# Patient Record
Sex: FEMALE | Race: WHITE | NOT HISPANIC OR LATINO | URBAN - METROPOLITAN AREA
[De-identification: names, ages, dates, MRNs, and addresses within clinical notes are randomized per-mention and may not be internally consistent; named-entity substitution may affect disease eponyms.]

---

## 2017-03-13 ENCOUNTER — OFFICE VISIT - RIVER FALLS (OUTPATIENT)
Dept: FAMILY MEDICINE | Facility: CLINIC | Age: 62
End: 2017-03-13

## 2017-03-13 ASSESSMENT — MIFFLIN-ST. JEOR: SCORE: 1162.86

## 2017-08-02 ENCOUNTER — OFFICE VISIT - RIVER FALLS (OUTPATIENT)
Dept: FAMILY MEDICINE | Facility: CLINIC | Age: 62
End: 2017-08-02

## 2017-08-02 ASSESSMENT — MIFFLIN-ST. JEOR: SCORE: 1176.47

## 2017-08-03 LAB
CREAT SERPL-MCNC: 0.97 MG/DL (ref 0.5–0.99)
GLUCOSE BLD-MCNC: 92 MG/DL (ref 65–99)

## 2017-09-15 ENCOUNTER — AMBULATORY - RIVER FALLS (OUTPATIENT)
Dept: FAMILY MEDICINE | Facility: CLINIC | Age: 62
End: 2017-09-15

## 2017-09-16 LAB
CHOLEST SERPL-MCNC: 187 MG/DL
CHOLEST/HDLC SERPL: 2.4 {RATIO}
HDLC SERPL-MCNC: 79 MG/DL
LDLC SERPL CALC-MCNC: 92 MG/DL
NONHDLC SERPL-MCNC: 108 MG/DL
TRIGL SERPL-MCNC: 71 MG/DL

## 2017-10-06 ENCOUNTER — AMBULATORY - RIVER FALLS (OUTPATIENT)
Dept: FAMILY MEDICINE | Facility: CLINIC | Age: 62
End: 2017-10-06

## 2018-09-21 ENCOUNTER — OFFICE VISIT - RIVER FALLS (OUTPATIENT)
Dept: FAMILY MEDICINE | Facility: CLINIC | Age: 63
End: 2018-09-21

## 2018-09-24 LAB
CHOLEST SERPL-MCNC: 209 MG/DL
CHOLEST/HDLC SERPL: 2.2 {RATIO}
HDLC SERPL-MCNC: 96 MG/DL
LDLC SERPL CALC-MCNC: 96 MG/DL
NONHDLC SERPL-MCNC: 113 MG/DL
TRIGL SERPL-MCNC: 82 MG/DL

## 2018-10-26 ENCOUNTER — OFFICE VISIT - RIVER FALLS (OUTPATIENT)
Dept: FAMILY MEDICINE | Facility: CLINIC | Age: 63
End: 2018-10-26

## 2018-10-26 ASSESSMENT — MIFFLIN-ST. JEOR: SCORE: 1173.75

## 2018-10-27 LAB
CREAT SERPL-MCNC: 0.98 MG/DL (ref 0.5–0.99)
GLUCOSE BLD-MCNC: 95 MG/DL (ref 65–99)

## 2018-10-31 ENCOUNTER — TRANSFERRED RECORDS (OUTPATIENT)
Dept: HEALTH INFORMATION MANAGEMENT | Facility: CLINIC | Age: 63
End: 2018-10-31

## 2018-10-31 LAB — HPV ABSTRACT: NORMAL

## 2022-02-11 VITALS
BODY MASS INDEX: 24.91 KG/M2 | DIASTOLIC BLOOD PRESSURE: 84 MMHG | TEMPERATURE: 98.1 F | HEART RATE: 72 BPM | TEMPERATURE: 98.6 F | SYSTOLIC BLOOD PRESSURE: 134 MMHG | HEIGHT: 64 IN | WEIGHT: 145 LBS | BODY MASS INDEX: 24.75 KG/M2 | SYSTOLIC BLOOD PRESSURE: 134 MMHG | WEIGHT: 144 LBS | HEART RATE: 84 BPM | DIASTOLIC BLOOD PRESSURE: 86 MMHG

## 2022-02-11 VITALS
HEART RATE: 69 BPM | SYSTOLIC BLOOD PRESSURE: 130 MMHG | OXYGEN SATURATION: 98 % | DIASTOLIC BLOOD PRESSURE: 80 MMHG | TEMPERATURE: 98 F | WEIGHT: 145.6 LBS | BODY MASS INDEX: 24.86 KG/M2 | HEIGHT: 64 IN

## 2022-02-11 VITALS
HEIGHT: 64 IN | TEMPERATURE: 98.3 F | BODY MASS INDEX: 24.34 KG/M2 | DIASTOLIC BLOOD PRESSURE: 72 MMHG | WEIGHT: 142.6 LBS | HEART RATE: 60 BPM | SYSTOLIC BLOOD PRESSURE: 120 MMHG

## 2022-02-16 NOTE — PROGRESS NOTES
"   Patient:   AURY ESPINOSA            MRN: [id number]            FIN: [id number]               Age:   62 years     Sex:  Female     :  1955   Associated Diagnoses:   GERD (gastroesophageal reflux disease); Parathyroid disease; Headache; Paresthesia   Author:   Tiffanie Moreno      Chief Complaint   3/13/2017 11:14 AM CDT   Pt here for lightheadedness, dizziness, chills/hot, feet, hands, arms, legs, face-feel tingly and hurt x 6 months getting worse.      History of Present Illness   Patient is a 61 yo female with past medical history of parathyroid adenoma s/p partial parathyroidectomy (Dr. Blakely: 2016 --> 2 of 4 glands removed), HTN, HLD who presents for tingling and pain in bilateral UEs and LEs. Per chart review, patient had MR brain on 16. Patient followed with [facility] on 16 for headaches. Diagnosed with poor sleep maintenance and chronic tension-type headaches. Note from visit states: \"I have reviewed her brain MRI personally with her and her . She has very few foci of scattered white matter changes, these are nonspecific and likely asymptomatic and noncontributory to her headaches. I''ll have her try amitriptyline to help decrease frequency and severity of headaches, and to also help her with sleep. At least tylenol is still effective as an abortive for her, she can continue to use it sparingly.\" Patient last followed with Dr. Blakely on 16, states she did not think any further surgery was indicated. Next follow up appt with Dr. Blakely is in May 2017.     Presents today with concern of tingling and \"crampy\" pain in extremities - reports LE tingling has been ongoing for at least 6 months, UE tingling has been ongoing for a few months (L > R). Patient was unsure if this was related to some component of tendonitis as she was frequently carrying her grandson and tried to use brace of UE without any relief. Tingling and pain seem to be constant but exacerbated when she is at " "rest. She does not notice symptoms as much when she is walking. Also complains of ongoing headaches, seem to be worse with lack of sleep and caffeine intake. States she feels lightheadded and \"off balance\" at times. No falls. Patient has been taking tylenol which mildly helps tingling/pain in extremities and headaches. She is not taking amitriptyline anymore because this did not help headaches. Patient also still experiencing reflux symptoms related to her hiatal hernia. Has relief of opeprazole but stopped taking this as she did not want to depend on medication. Tried using zantac but did not control symptoms.       Review of Systems   Constitutional:  No fever, No chills, No weakness, No fatigue, No decreased activity.    Eye:  No blurring, No double vision, No visual disturbances.    Ear/Nose/Mouth/Throat:  Negative.    Respiratory:  Negative.    Cardiovascular:  Negative.    Gastrointestinal:  Reflux, No nausea, No vomiting, No abdominal pain, No hematemesis.    Integumentary:  Negative.    Neurologic:  Alert and oriented X4, Abnormal balance, Numbness, Tingling, Headache, No confusion.       Health Status   Allergies:    Allergic Reactions (All)  No Known Medication Allergies  Canceled/Inactive Reactions (All)  No known allergies   Medications:  (Selected)   Prescriptions  Prescribed  amitriptyline 25 mg oral tablet: 1 tab(s) ( 25 mg ), po, hs, # 90 tab(s), 1 Refill(s), Type: Maintenance, Pharmacy: [facility] #[id number], 1 tab(s) po hs  lisinopril 10 mg oral tablet: 1 tab(s) ( 10 mg ), po, daily, # 90 tab(s), 3 Refill(s), Type: Maintenance, Pharmacy: [facility] #[id number], Please inform pt that she is overdue for annual check up and fasting labs per TOR.   Needs soon.   Thanks., 1 tab(s) po daily,x90 day(s)  simvastatin 20 mg oral tablet: 1 tab(s) ( 20 mg ), po, hs, # 90 tab(s), 3 Refill(s), Type: Maintenance, Pharmacy: [facility] #[id number], Pt is overdue for annual check up and fasting labs per TOR.   Please " inform pt.   Thanks., 1 tab(s) po hs,x90 day(s)  Documented Medications  Documented  Magnesium 150mg: Magnesium 150mg, See Instructions, Instructions: BID, Supply, 0 Refill(s), Type: Maintenance  Multiple Vitamins oral capsule: 1 cap(s), PO, Daily, 0 Refill(s), Type: Maintenance  Probiotic Formula: 1 cap(s), po, daily, 0 Refill(s), Type: Maintenance  Vitamin D3 5000 intl units oral tablet: 1 tab(s) ( 5,000 International Unit ), po, daily, 0 Refill(s), Type: Maintenance  aspirin: ( 81 mg ), po, daily, 0 Refill(s), Type: Maintenance  omeprazole 20 mg oral delayed release capsule: 1 cap(s) ( 20 mg ), PO, Daily, # 90 cap(s), 0 Refill(s), Type: Maintenance  raNITIdine 150 mg oral capsule: 1 cap(s) ( 150 mg ), po, prn, 0 Refill(s), Type: Maintenance   Problem list:    All Problems (Selected)  HTN (hypertension) / SNOMED CT 9786149804 / Confirmed  HLD (Hyperlipidemia) / SNOMED CT 071904205 / Confirmed  Parathyroid adenoma / SNOMED CT 212204938 / Confirmed  Seasonal allergies / SNOMED CT 934606585 / Confirmed  Former smoker, stopped smoking in distant past / SNOMED CT 767527131 / Confirmed      Histories   Past Medical History:    Active  HLD (Hyperlipidemia) (350446260)  HTN (hypertension) (5197339445)  Former smoker, stopped smoking in distant past (256023623)  Seasonal allergies (346000868)  Resolved  Inpatient stay (435088299): Onset on 2016 at 61 years.  Resolved on 2016 at 61 years.  Comments:  2016 CDT 4:54 PM CDT - Lexy Antonio  @[facility] - Primary hyperparathyroidism  Pregnancy (589836547):  Resolved in  at 21 years.  Pregnancy (785939419):  Resolved in  at 24 years.  Pregnancy (659163058):  Resolved in  at 29 years.   Family History:    Hypertension  Father ()  Sister  Sister  [place] - Cancer of colon  Father ()  Hypercholesterolemia  Father ()  Sister  Cancer of cervix  Sister     Procedure history:    Parathyroidectomy (26800097) on 2016 at 61  Years.  Comments:  2016 4:57 PM - Lexy Antonio  1. Bilateral superior parathyroidectomies.  2. PTH venous sampling.  3. Bilateral recurrent laryngeal nerve monitoring.  Upper GI endoscopy ([id number]) on 3/24/2016 at 61 Years.  Comments:  2016 1:29 PM - Amarilys Parker CMA  Impression:   1) LA Grade A reflux esophagitis. Biopsied.  2) Medium-sized hiatus hernia.  3) Normal  Biopsy Results:  A) Stomach, Antrum Biopsy     1. Gastric antral mucosa with some focal fundic features, showing with no diagnostic abnormalities.     2. Negative for Heliobacter organisms.  B) Esophagus, Biopsy     1. Esophageal squamous mucosa with no diagnostic abnormalities.     2. Some accompanying gastric mucosa     3. Negative for Rogers's metaplasia, reflux changes, and eosinophilic esophagitis.  Colonoscopy ([id number]) on 3/24/2016 at 61 Years.  Comments:  2016 1:40 PM - Amarilys Parker CMA  Indication: screening in patient at increased risk - family hx of 1st degree relative with colorectal ca  Sedation: MAC  Impression:   1) Diverticulosis in the sigmoid colon, in the proximal sigmoid colon and in the mid sigmoid colon.  2) No specimens collected.  3) Moderate diverticulosis in the sigmoid colon.  Echocardiogram ([id number]) on 3/16/2016 at 61 Years.  Comments:  3/21/2016 3:52 PM - Rosa Feliciano CMA  normal  Colonoscopy ([id number]) on 2011 at 56 Years.  Comments:  2011 8:55 AM - Guerda Mason MA  Repeat in 5 years  HPV - Human papillomavirus test negative (8500652968) on 3/24/2011 at 56 Years.  Comments:  3/31/2011 4:44 PM - Isabel Lawler  Low risk for cervical cancer. OK to have pap q 3 yrs.  Laparoscopy ([id number]) on 2000 at 45 Years.  Comments:  10/11/2010 11:52 AM - Sarah Ya  Remove right ovary and tubal ligation @ [facility].   x 3.  Comments:  10/11/2010 11:49 AM - Sarah Ya  , ,    Social History:        Alcohol Assessment: Current            Current           "           Comments:                      10/11/2016 - Wendy Garcia                     \"occasionally\"      Tobacco Assessment: Past            Former smoker, 5 year(s).                     Comments:                      10/11/2010 - Sarah aY                     Quit smoking.      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      Home and Environment Assessment            Marital status: .  Spouse/Partner name: Shade.  Lives with Spouse.      Exercise and Physical Activity Assessment: Regular exercise            Exercise frequency: Daily.  Exercise type: Walking.      Sexual Assessment            Number of current partners 1.  Sexual orientation: Heterosexual..            Sexually active: Yes.        Physical Examination   Vital Signs   3/13/2017 11:14 AM CDT Temperature Tympanic 98.3 DegF    Peripheral Pulse Rate 60 bpm    Pulse Site Radial artery    HR Method Manual    Systolic Blood Pressure 128 mmHg    Diastolic Blood Pressure 70 mmHg    Mean Arterial Pressure 89 mmHg    BP Site Right arm    BP Method Manual      Measurements from flowsheet : Measurements   3/13/2017 11:14 AM CDT Height Measured - Standard 63.75 in    Weight Measured - Standard 142.6 lb    BSA 1.7 m2    Body Mass Index 24.67 kg/m2      General:  Alert and oriented, No acute distress, Patient able to ambulate and get herself onto examination table without difficulty.    Eye:  Pupils are equal, round and reactive to light, Extraocular movements are intact, Normal conjunctiva, No nystagmus.    HENT:  Tympanic membranes are clear, Oral mucosa is moist, No pharyngeal erythema.    Neck:  Supple, Non-tender, No lymphadenopathy.    Respiratory:  Lungs are clear to auscultation, Respirations are non-labored.    Cardiovascular:  Normal rate, Regular rhythm.    Gastrointestinal:  Soft, Non-tender.  "   Musculoskeletal:  Normal range of motion, Normal gait.    Integumentary:  Warm, Dry.    Neurologic:  Alert, Oriented, Speech clear. No facial droop. Negative pronator drift. Monofilament exam revealed bilateal feet with impaired sensation to dorsal aspect of distal foot and plantar aspect of all toes, sensation intact to arch and heel of feet, no wounds noted.    Psychiatric:  Cooperative.       Review / Management   Results review:  Hgb, vitamin b12, magnesium, iron and TIBC, hgb a1c, PTH intact & calcium pending.       Impression and Plan   Diagnosis     GERD (gastroesophageal reflux disease) (FZC88-PA K21.9).     Parathyroid disease (QAU73-LS E21.5).     Headache (FZW09-GK R51).     Paresthesia (FEP62-CC R20.2).     Plan:  Presents with tingling in bilateral UEs and LEs ongoing for several months. Also continues to have headaches, saw neurology in May 2016 and dx with chronic tension-type headaches. Labs ordered today including hgb, vitamin b12, magnesium, iron and TIBC, hgb a1c, PTH intact & calcium.  - Reviewed brain MR results from 04/13/16 which showed few foci of scattered white matter changes as well as partially imaged cervical spondylosis. It is unclear if these findings may be contributing to her current paresthesia or headaches. Will have her follow up with neurology, patient does not want to go back to [facility]. Referral provided for her to follow up with Neurology here closely. Instructed her to continue using tylenol prn. Not going to add any other interventions at that time until further assessment by neurology has been completed.  - Continues to have reflux related to her hiatal hernia. Advised her to use omeprazole for control and can consider switching her to different H2 blocker at next follow up. Discussed dietary and lifestyle modifications such as elevating HOB, weight loss, avoiding large meals and instead eat small frequent meals, avoid eating within 3 hours of bedtime, along with  limiting caffeine, alcohol, spicy foods.    Patient Instructions:       Counseled: Patient, Spouse, Regarding diagnosis, Regarding treatment, Regarding medications, Diet, Activity, Verbalized understanding.    agree with exam and plan of care

## 2022-02-16 NOTE — PROGRESS NOTES
Patient:   AURY ESPINOSA            MRN: [id number]            FIN: [id number]               Age:   63 years     Sex:  Female     :  1955   Associated Diagnoses:   Abdominal pain; Gastritis; HLD (Hyperlipidemia)   Author:   Tiffanie Moreno      Chief Complaint   2018 9:28 AM CDT    Pt c/o upper abdominal burning sensation that started a week ago. Antacids not helping. Diarrhea a week ago and then thinner stools. Sx started after eating at [employer]. Leaving tomorrow to [place] on a train ride.      History of Present Illness   PPC for abdominal pain started over past few days since eating at [employer]; no nausea but constant burning sensation in upper abd  Aury has hx of generalized abdominal pain.  based on colonoscopy ( and ) as well as CT scan () she has known diverticular disease without diverticulitis  she has had recurrent epigastric pain and EGD in 3/2016 showed grade A reflux and was to remain on omeprazole but stopped using this.  Has been seeing a nutritionalist and has various herbal supplements (licorice root, herbal dandelion tea, vitamin B supplements)  Per Dr Blakely, 2016, she had parathyroid adenoma removal.  follow up Calcium level 2017 was normal but 2017 her amylase was elevated and CT was done but was again, normal  she has not been seen by me since that time      Review of Systems   Constitutional:  Negative.    Ear/Nose/Mouth/Throat:  Negative.    Respiratory:  Negative.    Cardiovascular:  Negative.    Gastrointestinal:  Negative except as documented in history of present illness.    Genitourinary:  Negative.    Hematology/Lymphatics:  Negative.    Endocrine:  Negative except as documented in history of present illness.    Immunologic:  Negative.    Musculoskeletal:  Negative.    Integumentary:  Negative.    Neurologic:  Negative.    Psychiatric:  Negative.       Health Status   Allergies:    Allergic Reactions (Selected)  No Known Medication Allergies    Medications:  (Selected)   Prescriptions  Prescribed  lisinopril 10 mg oral tablet: = 1 tab(s) ( 10 mg ), Oral, daily, # 30 tab(s), 0 Refill(s), Type: Maintenance, Pharmacy: [facility] #[id number], Due a visit now  omeprazole 20 mg oral delayed release capsule: 1 cap(s) ( 20 mg ), PO, Daily, # 90 cap(s), 1 Refill(s), Type: Maintenance, Pharmacy: [facility] #[id number], 1 cap(s) po daily  simvastatin 20 mg oral tablet: 1 tab(s) ( 20 mg ), po, hs, # 90 tab(s), 3 Refill(s), Type: Maintenance, Pharmacy: [facility] #[id number], 1 tab(s) po hs  Documented Medications  Documented  Digestive Enzyme: Digestive Enzyme, See Instructions, Supply, 0 Refill(s), Type: Maintenance  Magnesium 150mg: Magnesium 150mg, See Instructions, Instructions: BID, Supply, 0 Refill(s), Type: Maintenance  Multiple Vitamins oral capsule: 1 cap(s), PO, Daily, 0 Refill(s), Type: Maintenance  aspirin: ( 81 mg ), po, daily, 0 Refill(s), Type: Maintenance   Problem list:    All Problems (Selected)  Former smoker, stopped smoking in distant past / SNOMED CT 080145581 / Confirmed  HLD (Hyperlipidemia) / SNOMED CT 818311944 / Confirmed  HTN (hypertension) / SNOMED CT 2679332252 / Confirmed  Parathyroid adenoma / SNOMED CT 626398240 / Confirmed  Seasonal allergies / SNOMED CT 951818669 / Confirmed      Histories   Past Medical History:    Active  HLD (Hyperlipidemia) (553795910)  HTN (hypertension) (0953316946)  Former smoker, stopped smoking in distant past (800733875)  Seasonal allergies (625585850)  Resolved  Inpatient stay (447080336): Onset on 6/16/2016 at 61 years.  Resolved on 6/17/2016 at 61 years.  Comments:  9/19/2016 CDT 4:54 PM CDT - Lexy Antonio  @[facility] - Primary hyperparathyroidism  Pregnancy (635143267):  Resolved in 1976 at 21 years.  Pregnancy (332703961):  Resolved in 1979 at 24 years.  Pregnancy (821299310):  Resolved in 1984 at 29 years.      Physical Examination   VS/Measurements   General:  Alert and oriented, No acute distress,  able to move around without difficulty, able to climb on and off table without difficulty.    Eye:  Pupils are equal, round and reactive to light.    HENT:  Normocephalic.    Neck:  Supple, Non-tender.    Respiratory:  Lungs are clear to auscultation.    Cardiovascular:  Normal rate.    Gastrointestinal:          Abdomen: Right, Upper quadrant, RUQ tenderness with palpation, negative HSM, great bowel sounds throughout, No rebound tenderness.         Bowel sounds: All four quadrants, No bruit present.    Genitourinary:  No costovertebral angle tenderness.    Lymphatics:  No lymphadenopathy neck, axilla, groin.    Musculoskeletal:  Normal range of motion, Normal gait.    Integumentary:  Warm, Dry, Pink.    Neurologic:  Alert, Oriented, Normal sensory.    Psychiatric:  Cooperative, Appropriate mood & affect, Normal judgment.      GI cocktail given: after 60 minutes symptoms are almost completely resolved         Review / Management   Results review:  Lab results   9/21/2018 10:39 AM CDT Sodium Level 141 mmol/L    Potassium Level 4.0 mmol/L    Chloride Level 104 mmol/L    CO2 Level 21 mmol/L    AGAP 16    Glucose Level 100 mg/dL    BUN 15 mg/dL    Creatinine Level 1.04 mg/dL    BUN/Creat Ratio 14    eGFR  >60    eGFR Non-African American 54    Calcium Level 10.3 mg/dL    Bilirubin Total 0.7 mg/dL    Bilirubin Direct 0.3 mg/dL    Bilirubin Indirect 0.4 mg/dL    Alkaline Phosphatase 56 IU/L    AST/SGOT 19 IU/L    ALT/SGPT 23 IU/L    Protein Total 8.3 g/dL    Albumin Level 4.8 g/dL    Globulin 3.5 g/dL    A/G Ratio 1.4    Amylase Level 118 IU/L    Troponin I <0.010 ng/mL    WBC 5.5    RBC 4.28    Hgb 13.1 g/dL    Hct 38.2 %    MCV 89 fL    MCH 30.6 pg    MCHC 34.3 g/dL    RDW 13.3 %    Platelet 306    MPV 10.3 fL    Lymphocytes 25.0 %    Abs Lymphocytes 1.4    Neutrophils 63.8 %    Abs Neutrophils 3.5    Monocytes 9.4 %    Abs Monocytes 0.5    Eosinophils 1.3 %    Abs Eosinophils 0.1    Basophils 0.5 %     Abs Basophils 0.0    Sed Rate 24 mm/hr     .    ECG interpretation:  see Dr Santamaria's interpretation: normal EKG.       Impression and Plan   Diagnosis     Abdominal pain (BJH31-KS R10.9).     Gastritis (PXK48-SJ K29.70).     HLD (Hyperlipidemia) (TTK37-OQ E78.2).     Plan:  reviewed lab findings and previous CTs.  discussed good response to Gi cocktail, previous EGD and normal EKG  will treat for reflux/gastritis, will start her back on omeprazole daily, could increase this to bid but to begin with we will see if we can obtain control by using this qd as pt does not want to be on the medication at all,  will also add in carafate using this more aggressively at QID for 14d  during this time I have requested she limit spicey food, alcohol and repetitive hot drinks which can irritate the espohagus and stomach  if not improving she will RTC, she is going to [place] next week and I have cautioned her to avoid irritants while the stomach is healing.    Orders     Orders (Selected)   Prescriptions  Prescribed  Carafate 1 g oral tablet: = 1 tab(s) ( 1 gm ), PO, QID, # 56 tab(s), 0 Refill(s), Type: Maintenance, Pharmacy: [facility] #[id number], 1 tab(s) Oral qid,x14 day(s)  omeprazole 20 mg oral delayed release capsule: = 1 cap(s) ( 20 mg ), PO, Daily, # 90 cap(s), 0 Refill(s), Type: Maintenance, Pharmacy: [facility] #[id number], 1 cap(s) Oral daily.

## 2022-02-16 NOTE — PROGRESS NOTES
Patient:   AURY ESPINOSA            MRN: [id number]            FIN: [id number]               Age:   63 years     Sex:  Female     :  1955   Associated Diagnoses:   Abdominal pain; Chest pain; Chronic epigastric pain; Encounter for cervical Pap smear with pelvic exam; Vaginitis   Author:   Tiffanie Moreno      Chief Complaint   10/26/2018 10:17 AM CDT  Annual exam. Stomach pain concerns again. Mid back pain at night. Bilateral foot pain.      History of Present Illness   2017  Pt walked into clinic with complaints of epigastric discomfort, mild dysuria and lower back pain, has been seeing chiropractor who started her on digestive enzyme but she cannot rcall name or ingredients for me to know about interactions  hx of reflux, has been on omeprazole with good control but when she starts to feel better she stops this.  no diarrhea, no blood in stools, no weight loss,  hxof HTN, needs medications refilled  denies chest pain, denies alcohol intake  EGD 3/2016  used omeprazole with directions to notify me if not improving    2018  PPC for abdominal pain started over past few days since eating at [employer]; no nausea but constant burning sensation in upper abd  Aury has hx of generalized abdominal pain.  based on colonoscopy ( and ) as well as CT scan () she has known diverticular disease without diverticulitis  she has had recurrent epigastric pain and EGD in 3/2016 showed grade A reflux and was to remain on omeprazole but stopped using this.  Has been seeing a nutritionalist and has various herbal supplements (licorice root, herbal dandelion tea, vitamin B supplements)  Per Dr Blakely, 2016, she had parathyroid adenoma removal.  follow up Calcium level 2017 was normal but 2017 her amylase was elevated and CT was done but was again, normal  she has not been seen by me since that time   Plan:  reviewed lab findings and previous CTs.  discussed good response to Gi cocktail, previous EGD and  normal EKG  will treat for reflux/gastritis, will start her back on omeprazole daily, could increase this to bid but to begin with we will see if we can obtain control by using this qd as pt does not want to be on the medication at all,  will also add in carafate using this more aggressively at QID for 14d  during this time I have requested she limit spicey food, alcohol and repetitive hot drinks which can irritate the espohagus and stomach  if not improving she will RTC, she is going to [place] next week and I have cautioned her to avoid irritants while the stomach is healing.      10/26/18  PPC with recurrent abdominal pain, originally on my schedule for physcial exam  she restarted carafate as this was beneficial in 9/2018 for symptoms but it has not helped.  3/2016 had stress echo d/t epigastic symptoms and this was negative  in 9/2018 a series of labs were done when presenting with similar symptoms and toponin was negative and calcium was normal  she is teary eyed and tells me the burning in her stomach makes it hard for her to sleep, she is upset because no one can find an answer  she does not associate pain with specific food, she cannot find anything specific to alleviate pain, she has been using 20mg omeprazole at home once a day  no change in stools, no dysuria, has occasional foot pain which she feels is also caused by epigstric burning  she denies cough, tells me she ahs been on lisinopril for years and does not feel this contributes to this  she has had ovary removed in past d/t cyst and is wondering if something could be going 'down there' that would cause these symptoms, although she denies lower pelvic dicomfort and or bloating and we reviewed normal CT from 2017 which also looked at pelvis  she has had normal EKGs, negative tropponins and negative cardio-echo stress              Review of Systems   Constitutional:  Negative.    Ear/Nose/Mouth/Throat:  Negative.    Respiratory:  Negative.     Cardiovascular:  Negative.    Gastrointestinal:  Negative except as documented in history of present illness.    Genitourinary:  Negative.    Hematology/Lymphatics:  Negative.    Endocrine:  Negative.    Immunologic:  Negative.    Musculoskeletal:  Negative.    Integumentary:  Negative.    Neurologic:  Negative.    Psychiatric:  Negative.       Health Status   Allergies:    Allergic Reactions (Selected)  No Known Medication Allergies   Medications:  (Selected)   Prescriptions  Prescribed  Carafate 1 g oral tablet: = 1 tab(s) ( 1 gm ), PO, QID, # 56 tab(s), 0 Refill(s), Type: Maintenance, Pharmacy: [facility] #[id number], 1 tab(s) Oral qid,x14 day(s)  lisinopril 10 mg oral tablet: = 1 tab(s) ( 10 mg ), Oral, daily, # 30 tab(s), 0 Refill(s), Type: Maintenance, Pharmacy: [facility] #[id number], Due a visit now  omeprazole 20 mg oral delayed release capsule: = 1 cap(s) ( 20 mg ), PO, Daily, # 90 cap(s), 0 Refill(s), Type: Maintenance, Pharmacy: [facility] #[id number], 1 cap(s) Oral daily  simvastatin 20 mg oral tablet: = 1 tab(s) ( 20 mg ), po, hs, # 90 tab(s), 3 Refill(s), Type: Maintenance, Pharmacy: [facility] #[id number], 1 tab(s) Oral hs  Documented Medications  Documented  Digestive Enzyme: Digestive Enzyme, See Instructions, Supply, 0 Refill(s), Type: Maintenance  Magnesium 150mg: Magnesium 150mg, See Instructions, Instructions: BID, Supply, 0 Refill(s), Type: Maintenance  Multiple Vitamins oral capsule: 1 cap(s), PO, Daily, 0 Refill(s), Type: Maintenance  aspirin: ( 81 mg ), po, daily, 0 Refill(s), Type: Maintenance   Problem list:    All Problems (Selected)  Former smoker, stopped smoking in distant past / SNOMED CT 934236026 / Confirmed  HLD (Hyperlipidemia) / SNOMED CT 912138432 / Confirmed  HTN (hypertension) / SNOMED CT 3322072927 / Confirmed  Parathyroid adenoma / SNOMED CT 389243933 / Confirmed  Seasonal allergies / SNOMED CT 307532873 / Confirmed      Histories   Past Medical History:    Active  HLD  (Hyperlipidemia) (969886151)  HTN (hypertension) (3071300002)  Former smoker, stopped smoking in distant past (177396928)  Seasonal allergies (495473413)  Resolved  Inpatient stay ([id number]): Onset on 6/16/2016 at 61 years.  Resolved on 6/17/2016 at 61 years.  Comments:  9/19/2016 CDT 4:54 PM CDT - Lexy Antonio  @[facility] - Primary hyperparathyroidism  Pregnancy (478744955):  Resolved in 1976 at 21 years.  Pregnancy (978886580):  Resolved in 1979 at 24 years.  Pregnancy (223523921):  Resolved in 1984 at 29 years.      Physical Examination   Vital Signs   10/26/2018 10:17 AM CDT Temperature Tympanic 98.6 DegF    Peripheral Pulse Rate 72 bpm    Pulse Site Radial artery    HR Method Manual    Systolic Blood Pressure 134 mmHg  HI    Diastolic Blood Pressure 86 mmHg  HI    Mean Arterial Pressure 102 mmHg    BP Site Right arm    BP Method Manual      Measurements from flowsheet : Measurements   10/26/2018 10:17 AM CDT Height Measured - Standard 63.75 in    Weight Measured - Standard 145 lb    BSA 1.72 m2    Body Mass Index 25.08 kg/m2  HI      General:  Alert and oriented, No acute distress, able to move around without difficulty, able to climb on and off table without difficulty.    Eye:  Pupils are equal, round and reactive to light.    HENT:  Normocephalic.    Neck:  Supple, Non-tender.    Respiratory:  Lungs are clear to auscultation.    Cardiovascular:  Normal rate.    Gastrointestinal:          Abdomen: Epigastric, negative HSM, No rebound tenderness.         Bowel sounds: All four quadrants, No bruit present.    Genitourinary:  No costovertebral angle tenderness, could not feel ovaries.         Carlos scale: Stage V.         Labia: Bilateral, Within normal limits.         Mons pubis: WNL.         Adnexa: Bilateral, Within normal limits.    Lymphatics:  No lymphadenopathy neck, axilla, groin.    Musculoskeletal:  Normal range of motion, Normal strength.    Integumentary:  Warm, Dry, Pink.    Neurologic:  Alert,  Oriented, Normal sensory, Normal motor function.    Psychiatric:  Cooperative, Appropriate mood & affect, Normal judgment.       Impression and Plan   Diagnosis     Abdominal pain (LEN10-NI R10.9).     Chest pain (XCC52-NS R07.9).     Chest pain (TAR42-LR R07.9).     Chronic epigastric pain (EAQ30-QS R10.13).     Chronic epigastric pain (EMW11-MA R10.13).     Encounter for cervical Pap smear with pelvic exam (LRR73-QY Z01.419).     Encounter for cervical Pap smear with pelvic exam (XXM60-DG Z01.419).     Vaginitis (FBY20-YV N76.0).     Plan:  pt is very upset  GI cocktail provided some relief from ever-present burning  will treat vaginitis with one diflucan but this is nto contributing to her gastritis  will obtain labs, reviewed the ones available to me today  will contact her with labs results once the remainder have returned, no imaging done today, will refer to GI, she would like to see Dr Cano as her sister has seen him in the past  she will obtain H Pylori stool specimen  will increase omeprazole from 20mg daily to 20mg bid.    Orders     Orders (Selected)   Prescriptions  Prescribed  Diflucan 150 mg oral tablet: = 1 tab(s) ( 150 mg ), PO, Once, # 1 tab(s), 0 Refill(s), Type: Soft Stop, Pharmacy: [facility] #[id number], 1 tab(s) Oral once  omeprazole 20 mg oral delayed release capsule: = 1 cap(s) ( 20 mg ), PO, bid, # 180 cap(s), 0 Refill(s), Type: Maintenance, Pharmacy: [facility] #[id number], 1 cap(s) Oral bid.

## 2022-02-16 NOTE — PROGRESS NOTES
Patient:   ARUY ESPINOSA            MRN: [id number]            FIN: [id number]               Age:   62 years     Sex:  Female     :  1955   Associated Diagnoses:   Epigastric pain; HTN (hypertension); Urinary urgency   Author:   Tiffanie Moreno      Chief Complaint   2017 12:11 PM CDT    Pt here not feeling well, pressure in face, some cramping, lower back pain, urgent urine yet, burning sensation sitting yet not when urinating, temp around 99.2, stomach pain began about one week ago, rest of symptoms a few weeks, feeling lowsy        History of Present Illness   Pt walked into clinic with complaints of epigastric discomfort, mild dysuria and lower back pain, has been seeing chiropractor who started her on digestive enzyme but she cannot rcall name or ingredients for me to know about interactions  hx of reflux, has been on omeprazole with good control but when she starts to feel better she stops this.  no diarrhea, no blood in stools, no weight loss,  hxof HTN, needs medications refilled  denies chest pain, denies alcohol intake  EGD 3/2016         Review of Systems   Constitutional:  Negative.    Ear/Nose/Mouth/Throat:  Negative.    Respiratory:  Negative.    Cardiovascular:  Negative except as documented in history of present illness.    Gastrointestinal:  Negative except as documented in history of present illness.    Genitourinary:  Negative.    Hematology/Lymphatics:  Negative.    Endocrine:  Negative.    Immunologic:  Negative.    Musculoskeletal:  Negative.    Integumentary:  Negative.    Neurologic:  Negative.    Psychiatric:  Negative.       Health Status   Allergies:    Allergic Reactions (Selected)  No Known Medication Allergies   Medications:  (Selected)   Prescriptions  Prescribed  lisinopril 10 mg oral tablet: 1 tab(s) ( 10 mg ), po, daily, # 90 tab(s), 3 Refill(s), Type: Maintenance, Pharmacy: [facility] #[id number], Please inform pt that she is overdue for annual check up and fasting  labs per TOR.   Needs soon.   Thanks., 1 tab(s) po daily,x90 day(s)  omeprazole 20 mg oral delayed release capsule: 1 cap(s) ( 20 mg ), PO, Daily, # 90 cap(s), 1 Refill(s), Type: Maintenance, Pharmacy: [facility] #[id number], 1 cap(s) po daily  simvastatin 20 mg oral tablet: 1 tab(s) ( 20 mg ), po, hs, # 90 tab(s), 3 Refill(s), Type: Maintenance, Pharmacy: [facility] #[id number], Pt is overdue for annual check up and fasting labs per TOR.   Please inform pt.   Thanks., 1 tab(s) po hs,x90 day(s)  Documented Medications  Documented  Digestive Enzyme: Digestive Enzyme, See Instructions, Supply, 0 Refill(s), Type: Maintenance  Magnesium 150mg: Magnesium 150mg, See Instructions, Instructions: BID, Supply, 0 Refill(s), Type: Maintenance  Multiple Vitamins oral capsule: 1 cap(s), PO, Daily, 0 Refill(s), Type: Maintenance  aspirin: ( 81 mg ), po, daily, 0 Refill(s), Type: Maintenance   Problem list:    All Problems (Selected)  Former smoker, stopped smoking in distant past / SNOMED CT 378977362 / Confirmed  HLD (Hyperlipidemia) / SNOMED CT 842056170 / Confirmed  HTN (hypertension) / SNOMED CT 5886501062 / Confirmed  Parathyroid adenoma / SNOMED CT 743450500 / Confirmed  Seasonal allergies / SNOMED CT 437495536 / Confirmed      Histories   Past Medical History:    Active  HLD (Hyperlipidemia) (224119762)  HTN (hypertension) (7365149373)  Former smoker, stopped smoking in distant past (696132394)  Seasonal allergies (872208689)  Resolved  Inpatient stay (928615507): Onset on 6/16/2016 at 61 years.  Resolved on 6/17/2016 at 61 years.  Comments:  9/19/2016 CDT 4:54 PM CDT - Lexy Antonio  @[facility] - Primary hyperparathyroidism  Pregnancy (674872115):  Resolved in 1976 at 21 years.  Pregnancy (970784940):  Resolved in 1979 at 24 years.  Pregnancy (436110512):  Resolved in 1984 at 29 years.      Physical Examination   Vital Signs   8/2/2017 12:11 PM CDT Temperature Tympanic 98.0 DegF    Peripheral Pulse Rate 69 bpm    Pulse Site  Radial artery    Systolic Blood Pressure 130 mmHg    Diastolic Blood Pressure 80 mmHg    Mean Arterial Pressure 97 mmHg    BP Site Right arm    Oxygen Saturation 98 %      Measurements from flowsheet : Measurements   8/2/2017 12:11 PM CDT Height Measured - Standard 63.75 in    Weight Measured - Standard 145.6 lb    BSA 1.72 m2    Body Mass Index 25.19 kg/m2      General:  Alert and oriented, No acute distress.    Eye:  Pupils are equal, round and reactive to light.    HENT:  Normocephalic.    Neck:  Supple.    Respiratory:  Lungs are clear to auscultation, Respirations are non-labored.    Cardiovascular:  Normal rate, Regular rhythm, No edema.    Gastrointestinal:  Soft, Non-distended, Normal bowel sounds, No organomegaly, epigastric tenderness.    Genitourinary:  No costovertebral angle tenderness.    Musculoskeletal:  Normal range of motion, Normal strength, Normal gait.    Integumentary:  Warm, Dry, Pink.    Neurologic:  Alert, Oriented, Normal sensory, No focal deficits.    Psychiatric:  Cooperative, Appropriate mood & affect, Normal judgment.       Review / Management   Results review:  Lab results   8/2/2017 1:02 PM CDT UA Epithelial Cells Few    UA WBC 0-2    UA RBC None Seen    UA Bacteria None Seen   8/2/2017 12:43 PM CDT UA pH 5.5    UA Specific Gravity < OR = 1.005    UA Glucose NEGATIVE    UA Bilirubin NEGATIVE    UA Ketones NEGATIVE    Urine Occult Blood TRACE    UA Protein NEGATIVE    UA Nitrite NEGATIVE    UA Leukocyte Esterase NEGATIVE   .       Impression and Plan   Diagnosis     Epigastric pain (HEY35-OB R10.13).     HTN (hypertension) (WOF81-WW I10).     Urinary urgency (WMO14-KV R39.15).     Plan:   unfortunately, i do not know the name of the supplement given to her by her chiropractor and she is asking if I would recommend she remain ont his, I have encouraged her to bring it by so I can look at it, UA is negative, I am still waiting on the rest of her labs but have refilled her lisinopril and  her omeprazole for now, if her symptoms do not improve with PPI  I would like to hear from her, she has had endoscopy 3/2016 which showed esophagitis  with her concerns with thyroid and parathyroid I would also like to look at supplements before prolonged use.    Orders     Orders (Selected)   Prescriptions  Prescribed  lisinopril 10 mg oral tablet: 1 tab(s) ( 10 mg ), po, daily, # 90 tab(s), 3 Refill(s), Type: Maintenance, Pharmacy: [facility] #[id number], Please inform pt that she is overdue for annual check up and fasting labs per TOR.   Needs soon.   Thanks., 1 tab(s) po daily,x90 day(s)  omeprazole 20 mg oral delayed release capsule: 1 cap(s) ( 20 mg ), PO, Daily, # 90 cap(s), 1 Refill(s), Type: Maintenance, Pharmacy: [facility] #[id number], 1 cap(s) po daily  Documented Medications  Documented  Digestive Enzyme: Digestive Enzyme, See Instructions, Supply, 0 Refill(s), Type: Maintenance.

## 2022-02-16 NOTE — CARE COORDINATION
Pt appears on [facility] chronic disease panel as out of parameters for BP.  Pt has appt 10/26/2018 with provider, wait til then.

## 2022-02-16 NOTE — PROGRESS NOTES
Patient:   AURY ESPINOSA            MRN: [id number]            FIN: [id number]               Age:   63 years     Sex:  Female     :  1955   Associated Diagnoses:   None   Author:   Hina WU, Coby      Procedure   EKG procedure   Indication: chest pain.     Position: supine.     EKG findings   Interpretation: by primary care provider.     Rhythm: sinus normal.     Axis: normal axis, normal configuration.     Within normal limits.     Intervals: HI normal, QRS normal, QT normal.     Normal EKG.     P waves: normal.     QRS complex: normal, no Q waves present.     ST-T-U complex: normal.     Interpretation: ST-T wave abnormalities No acute ST changes.     Discussed: with primary care provider.        Impression and Plan   Orders

## 2022-02-16 NOTE — LETTER
(Inserted Image. Unable to display)     October 29, 2019      AURY ESPINOSA   [address] [place], [place] [id number]          Dear AURY,      Thank you for selecting [facility] (previously [facility]) for your healthcare needs.      Our records indicate you are due for the following services:     Annual Physical and Gynecologic Exam ~ Yearly wellness exams are important for your ongoing health and wellness.  This exam gives you the opportunity to meet with your health care provider to review your health, update immunizations and to recommend preventive screenings that you may be due for.  At your wellness visit, your Healthcare Provider will determine the need for a gynecologic exam and/or Pap smear.      To schedule an appointment or if you have further questions, please contact your primary clinic:   [facility]       [phone]   [facility]       [phone]              [facility]     [phone]      Powered by [employer]    Sincerely,    Healthcare Providers at [facility]

## 2022-02-16 NOTE — PROCEDURES
Accession Number:       [id number]  CLINICAL INFORMATION::     None given  LMP::     POSTMENPAUSAL  PREV. PAP::     2016  PREV. BX::     NONE GIVEN  SOURCE::     Cervix  STATEMENT OF ADEQUACY::     Satisfactory for evaluation. Endocervical/transformation zone component absent.  INTERPRETATION/RESULT::     Negative for intraepithelial lesion or malignancy.  COMMENT::     This Pap test has been evaluated with computer assisted technology.  CYTOTECHNOLOGIST::     DXP, CT(ASCP) CT Screening location: [place] [id number]  COMMENT:     See comment       EXPLANATORY NOTE:         The Pap is a screening test for cervical cancer. It is       not a diagnostic test and is subject to false negative       and false positive results. It is most reliable when a       satisfactory sample, regularly obtained, is submitted       with relevant clinical findings and history, and when       the Pap result is evaluated along with historic and       current clinical information.  HPV mRNA E6/E7:     Not Detected       This test was performed using the APTIMA HPV Assay ([employer]       This assay detects E6/E7 viral messenger RNA (mRNA) from 14       high-risk HPV types (16,18,31,33,35,39,45,51,52,56,58,59,66,68).         The analytical performance characteristics of       this assay have been determined by [employer]. The modifications have not been       cleared or approved by the FDA. This assay has       been validated pursuant to the CLIA regulations       and is used for clinical purposes.